# Patient Record
Sex: FEMALE | Race: WHITE | NOT HISPANIC OR LATINO | Employment: FULL TIME | URBAN - METROPOLITAN AREA
[De-identification: names, ages, dates, MRNs, and addresses within clinical notes are randomized per-mention and may not be internally consistent; named-entity substitution may affect disease eponyms.]

---

## 2023-04-10 NOTE — PROGRESS NOTES
[facility] CARDIOLOGY MAIN [place]    New Patient Office Visit    Patient Name: Oliva Zabala  : 1975   MRN: [id number]   Care Team: Patient Care Team:  Cate Mensah MD as PCP - General (General Practice)  Cate Mensah MD (General Practice)    Chief Complaint   Patient presents with   • Loss of Consciousness     CONSULT       HPI: Oliva Zabala is a 47 y.o. female with a history of hypothyroidism, hyperlipidemia, ascending aortic aneurysm, GERD who presents today for further evaluation and management of syncope.    She reports having a multiple year history of spells of syncope and presyncope.  Her most recent was in December of last year.  With that episode she began having severe stomach pain and passed out previously, she had an episode of dizziness while driving but did not pass out.    She reports having 3 total episodes starting in the year  where she had passed out on the toilet.  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    She feels she has been having dizziness and episodes of presyncope more frequently recently.  Her last episode was a couple weeks ago.  She reports being at work and just starting her shift and just standing there.  She does have palpitations as well and these can happen when laying in bed but seem to happen rarely and not necessarily associated with her events.    She had been seen by cardiologist in [place], Dr. Ajith Browne, had worn a 6-day monitor earlier this year but is unsure of the results.    Subjective   Review of Systems   Constitutional: Positive for fatigue.   Eyes: Positive for visual disturbance.   Cardiovascular: Positive for palpitations.   Gastrointestinal: Positive for abdominal pain, GERD and indigestion.       Past Medical History:   Diagnosis Date   • Anemia    • Anxiety    • C. difficile colitis    • GERD (gastroesophageal reflux disease)    • Heart murmur    • Hyperlipidemia    •  "Hypertension    • Hyperthyroidism      Past Surgical History:   Procedure Laterality Date   • CARDIOVASCULAR STRESS TEST     • ECHO - CONVERTED       Social History     Socioeconomic History   • Marital status:    Tobacco Use   • Smoking status: Former     Types: Cigarettes     Quit date: 2013     Years since quitting: 10.2   • Smokeless tobacco: Never   Vaping Use   • Vaping Use: Never used   Substance and Sexual Activity   • Alcohol use: Never   • Drug use: Never   • Sexual activity: Defer     Family History   Problem Relation Age of Onset   • No Known Problems Mother    • Heart attack Father        Current Outpatient Medications:   •  aluminum hydroxide-mag carbonate (GAVISCON EXTRA RELIEF) 160-105 MG chewable tablet chewable tablet, Chew 1 tablet 3 (Three) Times a Day With Meals., Disp: , Rfl:   •  clonazePAM (KlonoPIN) 1 MG tablet, TAKE ONE (1) TABLET BY MOUTH THREE (3) TIMES DAILY, Disp: , Rfl:   •  dicyclomine (BENTYL) 20 MG tablet, TAKE ONE (1) TABLET TWICE A DAY BY MOUTH, Disp: , Rfl:   •  estradiol (ESTRACE) 1 MG tablet, TAKE ONE (1) TABLET BY MOUTH EVERY DAY, Disp: , Rfl:   •  ibuprofen (ADVIL,MOTRIN) 600 MG tablet, , Disp: , Rfl:   •  loratadine (CLARITIN) 10 MG tablet, loratadine 10 mg tablet  TAKE 1 TABLET BY MOUTH EVERY DAY, Disp: , Rfl:   •  ondansetron ODT (ZOFRAN-ODT) 4 MG disintegrating tablet, , Disp: , Rfl:   •  pantoprazole (PROTONIX) 40 MG EC tablet, pantoprazole 40 mg tablet,delayed release  TAKE ONE (1) TABLET BY MOUTH EVERY DAY, Disp: , Rfl:      Allergies   Allergen Reactions   • Statins Myalgia   • Penicillins Rash     Objective     Vitals:    04/11/23 0953   BP: 110/80   BP Location: Right arm   Patient Position: Sitting   Pulse: 67   SpO2: 97%   Weight: 71.7 kg (158 lb)   Height: 162.6 cm (64\")     Body mass index is 27.12 kg/m².  Gen: well developed, sitting up on exam table, comfortable appearing  HEENT: MMM, sclera anicteric, conjunctiva normal, no carotid bruits  CV: regular " rate, regular rhythm, no murmurs or rubs, normal S1, S2. 2+ radial and PT pulses  Pulm: RA, normal work of breathing, no wheezes, rales, rhonchi  Abd: soft, non-tender, non-distended, +bowel sounds  Ext: normal bulk for age, normal tone, no dependent edema  Neuro: alert, oriented, face symmetrical, moving all extremities well  Psych: normal mood, appropriate affect     Most recent PCP note, imaging tests, and labs reviewed.    December 27, 2021  Transthoracic echocardiogram at [facility]  1. Normal left ventricular chamber size with normal LV systolic function.  Estimated EF 65 to 70%.  Moderate concentric hypertrophy of left ventricle.  There is Doppler evidence for impaired relaxation of the left ventricle  2.  No segmental wall motion abnormalities  3.  Normal left and right atrial size  4.  Normal RV size and function  5.  Normal mitral valve, no mitral insufficiency  6.  Normal aortic valve, mild aortic insufficiency  7.  No pericardial effusion  8.  Normal aortic root  9.  Normal tricuspid valve with TR regurgitant jet velocity of 2.4 m/s implying a RVSP of approximately 34 mmHg    October 24, 2022  CT of the chest with and without contrast at [facility]  1.  Stable fusiform aneurysmal dilation ascending thoracic aorta at 4.6 cm with aortic root measuring approximately 4.3 cm (recommend continued CT surveillance with neck examination in 6 months)  2.  Stable uncomplicated cardiac enlargement    January 10, 2022  GXT stress test  Patient exercised according to Navin protocol for 6 minutes and 17 seconds achieving a max workload of 7.4 METS.  Resting heart rate was 86 and pedro to a maximal heart rate of 151.  Resting blood pressure was 130/90 and pedro to maximum blood pressure of 150/100.    Resting EKG shows normal sinus rhythm with nonspecific ST and T wave changes.  With exercise the patient had less than 1 mm ST T-segment depression.  No arrhythmia.  Heart rate  and blood pressure response was appropriate.  Normal function capacity.  Rojas treadmill score +6 which is low risk.  Overall this is a normal exercise ECG.    1.  Normal exercise EKG  2.  Normal functional capacity  3.  Low risk Rojas treadmill score.    April 26, 2022  Bilateral lower extremity segmental arterial pressures  Normal ABIs bilaterally, right DP 1.14, PT 1.08, left DP 1.08, PT 1.10    Labs:  Lab Results   Component Value Date    WBC 4.72 01/12/2023    HGB 13.3 01/12/2023    HCT 41.0 01/12/2023    MCV 88 01/12/2023     01/12/2023     Lab Results   Component Value Date    HGBA1C 4.8 01/12/2023         ECG 12 Lead    Date/Time: 4/11/2023 8:58 AM  Performed by: Abdias Ewing MD  Authorized by: Abdias Ewing MD   Comparison: compared with previous ECG from 11/29/2022  Similar to previous ECG  Comparison to previous ECG: QT/QTc 418/441ms today, 420/496ms previously              Assessment & Plan       ICD-10-CM ICD-9-CM   1. Syncope and collapse  R55 780.2   2. Dizziness  R42 780.4   3. Aortic dilatation  I77.819 447.70      Syncope   - Review of her previous echocardiogram reports does show evidence of LVH but no evidence of LVOT obstruction and normal EF with no significant valvular disease   - We will try to obtain the report from her most recent wearable monitor prior to repeating this    Ascending aortic dilation   - Stable on most recently available CT from October of last year.  Should be due for another examination soon which has been performed at [facility].    Return in about 4 months (around 8/11/2023).    MIKE Ewing MD  04/11/23    [facility] Cardiology  [address]  [address] [place] [id number] [phone]

## 2023-04-11 ENCOUNTER — OFFICE VISIT (OUTPATIENT)
Dept: CARDIOLOGY | Facility: CLINIC | Age: 48
End: 2023-04-11
Payer: COMMERCIAL

## 2023-04-11 VITALS
BODY MASS INDEX: 26.98 KG/M2 | HEIGHT: 64 IN | HEART RATE: 67 BPM | OXYGEN SATURATION: 97 % | DIASTOLIC BLOOD PRESSURE: 80 MMHG | SYSTOLIC BLOOD PRESSURE: 110 MMHG | WEIGHT: 158 LBS

## 2023-04-11 DIAGNOSIS — R42 DIZZINESS: ICD-10-CM

## 2023-04-11 DIAGNOSIS — R55 SYNCOPE AND COLLAPSE: Primary | ICD-10-CM

## 2023-04-11 DIAGNOSIS — I77.819 AORTIC DILATATION: ICD-10-CM

## 2023-04-11 RX ORDER — PANTOPRAZOLE SODIUM 40 MG/1
TABLET, DELAYED RELEASE ORAL
COMMUNITY

## 2023-04-11 RX ORDER — ESTRADIOL 1 MG/1
TABLET ORAL
COMMUNITY
Start: 2023-03-27

## 2023-04-11 RX ORDER — IBUPROFEN 600 MG/1
TABLET ORAL
COMMUNITY
Start: 2023-01-25

## 2023-04-11 RX ORDER — CLONAZEPAM 1 MG/1
TABLET ORAL
COMMUNITY
Start: 2023-02-28

## 2023-04-11 RX ORDER — ONDANSETRON 4 MG/1
TABLET, ORALLY DISINTEGRATING ORAL
COMMUNITY
Start: 2023-01-25

## 2023-04-11 RX ORDER — LORATADINE 10 MG/1
TABLET ORAL
COMMUNITY

## 2023-04-11 RX ORDER — DICYCLOMINE HCL 20 MG
TABLET ORAL
COMMUNITY
Start: 2023-03-27